# Patient Record
Sex: MALE | Race: BLACK OR AFRICAN AMERICAN | Employment: UNEMPLOYED | URBAN - NONMETROPOLITAN AREA
[De-identification: names, ages, dates, MRNs, and addresses within clinical notes are randomized per-mention and may not be internally consistent; named-entity substitution may affect disease eponyms.]

---

## 2022-01-31 ENCOUNTER — HOSPITAL ENCOUNTER (INPATIENT)
Age: 27
LOS: 15 days | Discharge: HOME | DRG: 750 | End: 2022-02-15
Attending: PSYCHIATRY & NEUROLOGY | Admitting: PSYCHIATRY & NEUROLOGY
Payer: MEDICAID

## 2022-01-31 PROBLEM — F20.9 SCHIZOPHRENIA (HCC): Status: ACTIVE | Noted: 2022-01-31

## 2022-01-31 PROCEDURE — 74011250637 HC RX REV CODE- 250/637: Performed by: PSYCHIATRY & NEUROLOGY

## 2022-01-31 PROCEDURE — 65220000003 HC RM SEMIPRIVATE PSYCH

## 2022-01-31 RX ORDER — HYDROXYZINE 50 MG/1
50 TABLET, FILM COATED ORAL
Status: DISCONTINUED | OUTPATIENT
Start: 2022-01-31 | End: 2022-02-15 | Stop reason: HOSPADM

## 2022-01-31 RX ORDER — BENZTROPINE MESYLATE 1 MG/1
1 TABLET ORAL
Status: DISCONTINUED | OUTPATIENT
Start: 2022-01-31 | End: 2022-02-15 | Stop reason: HOSPADM

## 2022-01-31 RX ORDER — RISPERIDONE 1 MG/1
2 TABLET, FILM COATED ORAL 2 TIMES DAILY
Status: DISCONTINUED | OUTPATIENT
Start: 2022-01-31 | End: 2022-02-15 | Stop reason: HOSPADM

## 2022-01-31 RX ORDER — DIPHENHYDRAMINE HYDROCHLORIDE 50 MG/ML
50 INJECTION, SOLUTION INTRAMUSCULAR; INTRAVENOUS
Status: DISCONTINUED | OUTPATIENT
Start: 2022-01-31 | End: 2022-02-15 | Stop reason: HOSPADM

## 2022-01-31 RX ORDER — ACETAMINOPHEN 325 MG/1
650 TABLET ORAL
Status: DISCONTINUED | OUTPATIENT
Start: 2022-01-31 | End: 2022-02-15 | Stop reason: HOSPADM

## 2022-01-31 RX ORDER — ADHESIVE BANDAGE
30 BANDAGE TOPICAL DAILY PRN
Status: DISCONTINUED | OUTPATIENT
Start: 2022-01-31 | End: 2022-02-15 | Stop reason: HOSPADM

## 2022-01-31 RX ORDER — TRAZODONE HYDROCHLORIDE 50 MG/1
50 TABLET ORAL
Status: DISCONTINUED | OUTPATIENT
Start: 2022-01-31 | End: 2022-02-15 | Stop reason: HOSPADM

## 2022-01-31 RX ORDER — HALOPERIDOL 5 MG/ML
5 INJECTION INTRAMUSCULAR
Status: DISCONTINUED | OUTPATIENT
Start: 2022-01-31 | End: 2022-02-15 | Stop reason: HOSPADM

## 2022-01-31 RX ORDER — LORAZEPAM 2 MG/ML
1 INJECTION INTRAMUSCULAR
Status: DISCONTINUED | OUTPATIENT
Start: 2022-01-31 | End: 2022-02-15 | Stop reason: HOSPADM

## 2022-01-31 RX ORDER — IBUPROFEN 200 MG
1 TABLET ORAL DAILY
Status: DISCONTINUED | OUTPATIENT
Start: 2022-01-31 | End: 2022-02-13

## 2022-01-31 RX ORDER — OLANZAPINE 2.5 MG/1
5 TABLET ORAL
Status: DISCONTINUED | OUTPATIENT
Start: 2022-01-31 | End: 2022-02-15 | Stop reason: HOSPADM

## 2022-01-31 RX ADMIN — RISPERIDONE 2 MG: 1 TABLET ORAL at 20:18

## 2022-01-31 RX ADMIN — RISPERIDONE 2 MG: 1 TABLET ORAL at 15:39

## 2022-01-31 NOTE — BH NOTES
Behavioral Health Treatment Team Note     Patient goal(s) for today: [place]  Treatment team focus/goals: Engage in treatment, monitor medications    Progress note: Pt presents under TDO. Pt brought to unit to orient to the program.  Pt laid down in bed and rested the rest of the afternoon. Will attempt biopsychosocial tomorrow. LOS:  0  Expected LOS: 3    Insurance info/prescription coverage:  [facility]  Date of last family contact:  None  Family requesting physician contact today:  no  Discharge plan:   To be determined  Guns in the home: Unknown  Outpatient provider(s):  Unknown

## 2022-01-31 NOTE — CONSULTS
Hospitalist Consult  Primary Care Provider: None  Consult requested by: Dr Braulio Damon:     Junior Powers is a 27 y.o. male who is being evaluated in the behavioral health unit for paranoid schizophrenia. Patient was brought to the emergency room at an outside facility after her mother called for ECO. Patient with paranoid schizophrenia, has been walking to his girlfriend outside in the cold and also try to jump off a moving vehicle while on the way to the ER. Currently patient is calm and cooperative. No acute complaint at this time. He/She denies any headaches or dizziness. Denies any cough, chest pain, shortness of breath. Denies fever or chills. Denies nausea, vomiting, diarrhea, constipation. Review of Systems:    A comprehensive review of systems was negative except for that written in the History of Present Illness. Past Medical History:   Diagnosis Date    [facility] ([facility]     [facility]       History reviewed. No pertinent surgical history. Prior to Admission medications    Not on File     No Known Allergies   Family History   Problem Relation Age of Onset    No Known Problems Mother     No Known Problems Father     No Known Problems Sister     No Known Problems Brother     No Known Problems Maternal Aunt     No Known Problems Maternal Uncle     No Known Problems Paternal Aunt     No Known Problems Paternal Uncle     No Known Problems Maternal Grandmother     No Known Problems Maternal Grandfather     No Known Problems Paternal Grandmother     No Known Problems Paternal Grandfather     No Known Problems Other     Depression Neg Hx     Suicide Neg Hx     Psychotic Disorder Neg Hx     Substance Abuse Neg Hx     Dementia Neg Hx         SOCIAL HISTORY:  Patient resides at Home. Patient ambulates with no assistance.    Smoking history: 3 cigarettes/day  Alcohol history: None  Drug use: Marijuana    Objective:       Physical Exam:   Visit Vitals  /81 (BP Patient Position: Sitting)   Pulse 63   Temp 97.8 °F (36.6 °C)   Resp 20   Ht 5' 11\" (1.803 m)   SpO2 98%     General:  Alert, cooperative, no distress, appears stated age. Head:  Normocephalic, without obvious abnormality, atraumatic. Eyes:  Conjunctivae/corneas clear. PERRL, EOMs intact. Fundi benign   Ears:  Normal TMs and external ear canals both ears. Nose: Nares normal. Septum midline. Mucosa normal. No drainage or sinus tenderness. Throat: Lips, mucosa, and tongue normal. Teeth and gums normal.   Neck: Supple, symmetrical, trachea midline, no adenopathy, thyroid: no enlargement/tenderness/nodules, no carotid bruit and no JVD. Back:   Symmetric, no curvature. ROM normal. No CVA tenderness. Lungs:   Clear to auscultation bilaterally. Chest wall:  No tenderness or deformity. Heart:  Regular rate and rhythm, S1, S2 normal, no murmur, click, rub or gallop. Abdomen:   Soft, non-tender. Bowel sounds normal. No masses,  No organomegaly. Genitalia:  Deferred   Rectal:  Deferred   Extremities: Extremities normal, atraumatic, no cyanosis or edema. Pulses: 2+ and symmetric all extremities. Skin: Skin color, texture, turgor normal. No rashes or lesions   Lymph nodes: Cervical, supraclavicular, and axillary nodes normal.   Neurologic: CNII-XII intact. Normal strength, sensation and reflexes throughout. Data Review: All diagnostic labs and studies have been reviewed. Assessment:     Active Problems:    Schizophrenia ([facility] (1/31/2022)        Plan:     Inpatient psychiatry management per [facility] team.  May offer nicotine patch if patient desires. Otherwise medically stable.     Signed By: Malcom Vega MD     Date of Service:  1/31/2022

## 2022-01-31 NOTE — BH NOTES
Pt arrived on BHU under a TDO, ambulatory, accompanied by transport person and security. Pt vital signs are stable, no s/s of physical distress noted. Pt denies SI/HI upon admission. Per prescreen Mom states Pt exited the moving vehicle when she brought him to the ER. Denies A/V hallucinations, however s/s present including requiring repetition of instructions. Pt reports he's not sure why he's here, states \"Mom told Police, I didn't do anything. .. I'm still here\". Pt mom is Amor Rosales, whom he lives with and she took out the TDO. Pt has a history of psych admission but is unable to recall where, prescreen identifies [facility] 11/24/21 and [facility] 7/6/21. Pt states he has not been taking any medications for last month because his insurance would not cover it, per prescreen Pt was on Invega oral.  Pt mood is calm, stable. Pt is answering questions appropriately however appears guarded. Pt changed into hospital scrubs, and his clothing (hoodie, t-shirt,stocking hat, sweatpants, socks, slippers, and underwear) and cigarettes w. Lighter were placed in locker. No contraband observed. Pt was instructed on hospital policies and given Pt handbook. Pt requests nicotine patch states he smokes 3 cigarettes/day, denies alcohol use, acknowledges THC. Pt was given tour of unit and escorted to his room 105-1. Pt attempted to call his mom, however she did not answer. q15 minute checks for safety.

## 2022-02-01 LAB
CHOLEST SERPL-MCNC: 102 MG/DL
EST. AVERAGE GLUCOSE BLD GHB EST-MCNC: 91 MG/DL
HBA1C MFR BLD: 4.8 % (ref 4–5.6)
HDLC SERPL-MCNC: 59 MG/DL
HDLC SERPL: 1.7 {RATIO} (ref 0–5)
LDLC SERPL CALC-MCNC: 32 MG/DL (ref 0–100)
LIPID PROFILE,FLP: NORMAL
TRIGL SERPL-MCNC: 55 MG/DL (ref ?–150)
VLDLC SERPL CALC-MCNC: 11 MG/DL

## 2022-02-01 PROCEDURE — 80061 LIPID PANEL: CPT

## 2022-02-01 PROCEDURE — 74011250637 HC RX REV CODE- 250/637: Performed by: PSYCHIATRY & NEUROLOGY

## 2022-02-01 PROCEDURE — 83036 HEMOGLOBIN GLYCOSYLATED A1C: CPT

## 2022-02-01 PROCEDURE — 65220000003 HC RM SEMIPRIVATE PSYCH

## 2022-02-01 RX ADMIN — RISPERIDONE 2 MG: 1 TABLET ORAL at 20:27

## 2022-02-01 RX ADMIN — RISPERIDONE 2 MG: 1 TABLET ORAL at 08:09

## 2022-02-01 NOTE — BH NOTES
B: Pt. Alert and oriented x 4. Pt. States depression is a 3. Pt. States anxiety is 5. Pt. Denies hallucinations. Denies SI/HI. Cooperative with assessment. Pt. States he is just tired and wants to rest, Pt. States, \" I am in no distress, just sleepy\". I:   Administer medications as ordered and needed, Encourage pt to attend and participate in groups, encourage pt to be up for all meals and snacks, consuming all each time, encourage pt to interact with peers in a positive manner. Q 15 minute safety checks continue. R:   Compliant with medications. does attend groups, does participate. Pt. si getting up for meals, consumes all of meals, snacks. Pt. does interact with peers. no safety concerns at this time. P:   Pt. Will develop and continue to utilize positive coping skills.

## 2022-02-01 NOTE — GROUP NOTE
MELVINA  GROUP DOCUMENTATION INDIVIDUAL                                                                          Group Therapy Note    Date: 2/1/2022    Group Start Time: 1430  Group End Time: 6844  Group Topic: Education Group - Inpatient    [facility]    Group Therapy Note    LCSW facilitated psychoeducational group on emotions. Group members participated in discussion of emotions, their most uncomfortable emotions, and related questions. LCSW facilitated experiential learning exercise on mindful acceptance of emotions. Attendance: Attended    Patient's Goal:  Attendance    Interventions/techniques: Informed, Validated and Supported    Follows Directions:  Followed directions    Interactions: Interacted appropriately    Mental Status: Calm and Congruent    Behavior/appearance: Attentive and Cooperative    Goals Achieved: Able to engage in interactions and Able to listen to others        MARTI Benjamin, LCSW unsure

## 2022-02-01 NOTE — BH NOTES
PSYCHOSOCIAL ASSESSMENT  :Patient identifying info:   Nile Haynes is a 27 y.o., male admitted 1/31/2022  8:52 AM     Presenting problem and precipitating factors: Pt is under TDO. Pt denies knowing why he is here. Pt reports, \"I didn't do anything. Mom told the police probably to control me. \" Pt reports, \"Mom wanted to get me out of the house. \"  Pt denies knowledge of any situation, arguments, or precipitating factors leading to hospitalization. Pt reports he was not taking medications outside of the hospital because his insurance would not pay for it. Pt reports a hx of hospitalizations including one at the [facility] last year. Pt states he has been diagnosed with \"Paranoid Schizophrenia. \" Pt reports THC use, but states that \"I do not have money right now. \" Pt reports before he was using marijuana daily, but could not state the amount. Pt reports a hx of alcohol abuse up to 2 bottles of various kinds of liquor a day. Pt reports he has not had a drink in several months. Mental status assessment: Pt is AO x 4, denies SI/HI, denies AVH, cooperative with assessment. Pt presents guarded with a flat affect. Pt appeared fatigued. Reports his mood is [place]. \"  Thinking is logical upon interview. Speech is goal-oriented, soft tone, and slow. Insight is poor to fair. Strengths/Recreation/Coping Skills: Denies recreation or coping skills. Collateral information: Boni arana B [phone]: Prescreen notes pt's mother petitioned for [employer] stating, \"Casimiro is paranoid schizophrenic and he hasn't been taking his meds. Rita Bloodgood Rita Bloodgood \"  She also reports that pt has been walking out in the cold to his ex-girlfriend's house where he is not wanted - police were called to get him to leave. Pt jumped out of the car when turning into the hospital. Danne Son notes pt began hearing voices in 2021 telling him to harm others. Pt describes hearing one male voice upon interview, but not discernable words.  Delusional thoughts about being a prophet and belief in the illuminati. Paranoia that others or something is watching him. Pt ran out of medication due to inability to pay $1300 or it. Current psychiatric /substance abuse providers and contact info: Kena Porras    Previous psychiatric/substance abuse providers and response to treatment: Clara    Family history of mental illness or substance abuse: Denies    Substance abuse history:  Hx of alcohol abuse up to 2 bottles of liquor a day - various types. THC use. Social History     Tobacco Use    Smoking status: Current Every Day Smoker     Packs/day: 0.25     Years: 10.00     Pack years: 2.50    Smokeless tobacco: Never Used   Substance Use Topics    Alcohol use: Not Currently       History of biomedical complications associated with substance abuse: None noted    Patient's current acceptance of treatment or motivation for change: Pt desires discharge. Family constellation: Pt was raised by his mother. Pt does not have a relationship with his father. Pt has 4 siblings. Is significant other involved? None    Describe support system: Mother    Describe living arrangements and home environment: Pt reports he lives at home at this time with his mother. 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      Have you been licensed as a health care professional (current or ): None    Leisure and recreation preferences: None    Describe coping skills:None    Chelo Ríos, MSW, LCSW  2022    PSA PART II ADDITIONAL INFORMATION        Access To Fire Arms: No    Substance Use: YES    Last Use: January 2022    Type of Substance: THC use    Frequency of Use: Occasional     Request to See : NO    If yes, notified : NO    Guardian:NO    Guardian Contact:None    Release of Information Signed: YES    Release of Information Signed For: Cele Elliott: [phone]

## 2022-02-01 NOTE — PROGRESS NOTES
Problem: Psychosis  Goal: *STG: Remains safe in hospital  1/31/2022 2347 by Jenni Ni RN  Outcome: Progressing Towards Goal  1/31/2022 2347 by Jenni Ni RN  Outcome: Progressing Towards Goal  Goal: *STG/LTG: Complies with medication therapy  Outcome: Progressing Towards Goal

## 2022-02-01 NOTE — BH NOTES
B: Pt alert and oriented x3, Pt cooperative and pleasant during assessment. Pt completed his OQ Analyst  Questionnaire. Pt denies anxiety, depression, SI/HI and AVH. Pt given Risperdal 2mg po and Nicotine Patch retrieved. Poor eye contact and appears a little bizarre. Pt continues on Q15min safety checks.      I: Encourage pt to attend and participate in all groups and wrap up; Administer medication as ordered and needed. Encourage pt be up for all meals and snacks. Encourage pt to interact with staff/peers in a positive manner; Pt continues on [id number] safety checks.     R: Pt received snack and participated in wrap up.  Did express on wrap up sheet \" dealing with being in this crazy house\" Pt did interact with his roommate and staff. Pt medication administer at this time. Pt continues on Q15min safety checks.     P: Pt will develop and utilize positive coping skills. Pt Will continue on Q15min safety checks     0600: Pt slept throughout the night with no distress noted

## 2022-02-02 PROCEDURE — 74011250637 HC RX REV CODE- 250/637: Performed by: PSYCHIATRY & NEUROLOGY

## 2022-02-02 PROCEDURE — 65220000003 HC RM SEMIPRIVATE PSYCH

## 2022-02-02 RX ADMIN — RISPERIDONE 2 MG: 1 TABLET ORAL at 21:19

## 2022-02-02 RX ADMIN — RISPERIDONE 2 MG: 1 TABLET ORAL at 08:16

## 2022-02-02 NOTE — H&P
Initial psychiatric evaluation    Chief complaint  Exiting moving vehicle ,paranoid ideations, responding to internal stimuli, depressed mood    History of present illness  82-year-old male admitted to psychiatric unit on a temporary detaining order  He was admitted due to safety concerns, and acute psychotic outlook with paranoid ideations and hearing voices. He exited the morning week of angry he was brought to the emergency room. He was confused and paranoid and required repetition of instructions. He said that his mom was concerned about him and mother told police about his difficulties and recent issues. He has history of psychiatric admissions but is unable to give good history and he has been in [facility] before and November and [facility] in [place] in July. He has not been taking his medications for the last month which I believe was John Shivani. He was willing to take other medications similar to that and we ordered risperidone 2 mg to take as a day for him. He has been having difficulties with paranoid ideations not able to think clearly confused preoccupied and hearing voices likely responding to internal stimuli.   He is willing to continue his hospitalization and pledges for safety on the unit admits to depressive symptoms as well depressed mood anhedonia low energy and low motivation along with psychotic symptoms    Past psychiatric history  Multiple previous psychiatric hospitalizations due to psychotic symptoms in larger area hospitals such as [address] [place] [address] and [facility] [place] [facility].  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  Recently unemployed    Trauma history  Denied    Legal history  He was drunk in [address] status exam  Alert oriented fair eye contact  Speech is goal-directed soft tone  Mood is depressed affect is appropriate and guarded at times  Positive paranoid ideations  Positive responding to internal stimuli  Auditory hallucinations  Insight and judgment poor to fair    Diagnosis  Schizophrenia    Recommendations  Patient is appropriate for psychiatric hospitalization which is medically necessary  I will start him on risperidone 2 mg 2 times a day  He will also benefit from psychosocial interventions  Follow-up with me in the team again tomorrow

## 2022-02-02 NOTE — BH NOTES
Notified Dr. Javid Correa regarding  and regular, BP 93/57. Per Dr. Javid Correa, okay to encourage frequent hydration.

## 2022-02-02 NOTE — BH NOTES
B: Pt alert and oriented x3, Pt cooperative and pleasant during assessment. Pt denies anxiety, depression, SI/HI and AVH. Report that pt stayed in bed most of the day. Did write on wrap up, that he handled his anger and the voices well today but when I asked about voices pt denied. Poor eye contact still remains. Pt continues on Q15min safety checks.      I: Encourage pt to attend and participate in all groups and wrap up; Administer medication as ordered and needed. Encourage pt be up for all meals and snacks. Encourage pt to interact with staff/peers in a positive manner; Pt continues on [id number] safety checks.     R: Pt received snack and participated in wrap up. Did write on wrap up, that he handled his anger and the voices well today but when I asked about voices pt denied. Pt did interact with his roommate and staff. Pt medication administer at this time. Pt continues on Q15min safety checks.     P: Pt will develop and utilize positive coping skills. Pt Will continue on Q15min safety checks. 0545: Heart rate 118.  Once again setup really quick.     0600: Pt slept throughout the night with no distress noted

## 2022-02-02 NOTE — PROGRESS NOTES
Discussed case interviewed patient  Taking and tolerating medications well  He is now on risperidone 2 mg 2 times a day  Getting along okay with his roommate  Interacts with peers and staff  Attend some groups  Remains paranoid and guarded  Sleep and appetite is fair  No overt aggression    Mental status exam  Alert oriented good eye contact  Speech is goal-directed soft tone  Mood is depressed  Affect is restricted  Thought process linear and illogical  Insight and judgment fair  Positive paranoid ideations    Recommendations  Continue inpatient treatments which is medically necessary  Safety issues discussed  Follow-up with me in team again tomorrow

## 2022-02-02 NOTE — PROGRESS NOTES
Spiritual Care Assessment/Progress Note  [facility]      NAME: Ale Pedraza      MRN: [id number]  AGE: 27 y.o.  SEX: male  Voodoo Affiliation: No preference   Language: English     2/2/2022     Total Time (in minutes): 10     Spiritual Assessment begun in [facility] through conversation with:         [x]Patient        [] Family    [] Friend(s)        Reason for Consult: Initial/Spiritual assessment, patient floor     Spiritual beliefs: (Please include comment if needed)     [] Identifies with a darby tradition:         [] Supported by a darby community:            [x] Claims no spiritual orientation:           [] Seeking spiritual identity:                [] Adheres to an individual form of spirituality:           [] Not able to assess:                           Identified resources for coping:      [] Prayer                               [] Music                  [] Guided Imagery     [] Family/friends                 [] Pet visits     [] Devotional reading                         [] Unknown     [] Other:                                              Interventions offered during this visit: (See comments for more details)    Patient Interventions: Affirmation of emotions/emotional suffering,Voodoo beliefs/image of God discussed           Plan of Care:     [x] Support spiritual and/or cultural needs    [] Support AMD and/or advance care planning process      [] Support grieving process   [] Coordinate Rites and/or Rituals    [] Coordination with community clergy   [] No spiritual needs identified at this time   [] Detailed Plan of Care below (See Comments)  [] Make referral to Music Therapy  [] Make referral to Pet Therapy     [] Make referral to Addiction services  [] Make referral to [facility]  [] Make referral to Spiritual Care Partner  [] No future visits requested        [x] Contact Spiritual Care for further referrals     Comments: Visited with patient while in the [facility] Unit. Patient shared that he does not identify with any particular Rastafarian.  provided emotional and spiritual support. Advised of  availability. Advised nurse to contact [facility] for any further referrals. Rev.  Andria Males, [address], [address]

## 2022-02-02 NOTE — PROGRESS NOTES
Problem: Psychosis  Goal: *STG: Decreased hallucinations  Outcome: Not Progressing Towards Goal  Goal: *STG: Remains safe in hospital  Outcome: Progressing Towards Goal  Goal: *STG/LTG: Complies with medication therapy  Outcome: Progressing Towards Goal

## 2022-02-02 NOTE — BH NOTES
B: Pt. Alert and oriented x 3. Pt. States depression is a 6. Pt. States anxiety is 4. Pt. Endorses auditory hallucinations. Denies SI/HI. No other complaints with assessment. I:   Administer medications as ordered and needed, Encourage pt to attend and participate in groups, encourage pt to be up for all meals and snacks, consuming all each time, encourage pt to interact with peers in a positive manner. Q 15 minute safety checks continue. R:   Compliant with medications. does not attend groups, does not participate. Pt. is getting up for meals, consumes 75% of meals, snacks. Pt. does interact with peers. no safety concerns at this time. P:   Pt. Will develop and continue to utilize positive coping skills. Patient will attend and participate in group sessions.

## 2022-02-03 PROCEDURE — 74011250637 HC RX REV CODE- 250/637: Performed by: PSYCHIATRY & NEUROLOGY

## 2022-02-03 PROCEDURE — 65220000003 HC RM SEMIPRIVATE PSYCH

## 2022-02-03 RX ADMIN — RISPERIDONE 2 MG: 1 TABLET ORAL at 08:22

## 2022-02-03 RX ADMIN — RISPERIDONE 2 MG: 1 TABLET ORAL at 21:16

## 2022-02-03 NOTE — BH NOTES
B: Pt has been resting in bed this evening, up for evening group and snack. Denies suicidal and homicidal thoughts, says he is not feeling depressed or anxious at this time, just tired. Compliant with meds, safe on unit. I: Maintain a safe environment for pt, safety cks q 15 mins and prn. Give meds as ordered, and encourage groups. R: Pt is cooperative with assessment, denies suicidal or homicidal thoughts. Some thoughts blocking noted and pt noted to be shaking his foot rapidly while talking to nurse. States he does not feel depressed or anxious at this time. Encouraged pt to ask staff if he needs anything or has any questions. Safe on unit. P: Continue to monitor, give meds as ordered, encourage groups.

## 2022-02-03 NOTE — BH NOTES
Behavioral Health Treatment Team Note     Patient goal(s) for today: Learn alternatives to aggression  Treatment team focus/goals: Engage in treatment, monitor medications    Progress note: Involuntary commitment up to 30 days. Pt is AO x 4, denies SI/HI, denies AVH, cooperative with assessment. Pt presents guarded with a flat affect. Pt appears fatigued. Reports his mood is \"good. \"  Thinking is logical upon interview. Speech is goal-oriented, soft tone, and slow. Insight is poor to fair. Pt is coming to groups and participating when called upon. Pt remains mostly quiet. Pt desires to keep working on ways of being less aggressive. Pt is working to identify negative thinking patterns and is working on distancing and challenging negative patterns. Will continue to engage in treatment and monitor medications.     LOS:  3  Expected LOS: 30    Insurance info/prescription coverage:  [address]  Date of last family contact: None  Family requesting physician contact today:  no  Discharge plan:  Discharge home with follow-up in the community  Guns in the home:  no   Outpatient provider(s):  [email or web address]

## 2022-02-03 NOTE — BH NOTES
Pt slept well tonight with no complaints voiced and no problems noted on rounds. Safe on unit will continue to monitor.

## 2022-02-03 NOTE — BH NOTES
B: Pt. Alert and oriented x 3. Pt. States depression is a 4. Pt. States anxiety is 2. Pt. denies hallucinations. Denies SI/HI. No complaints with assessment. I:   Administer medications as ordered and needed, Encourage pt to attend and participate in groups, encourage pt to be up for all meals and snacks, consuming all each time, encourage pt to interact with peers in a positive manner. Q 15 minute safety checks continue. R:   Compliant with medications. does attend groups, does participate. Pt. is getting up for meals, consumes 100% of meals, snacks. Pt. does interact with peers. no safety concerns at this time. P:   Pt. Will develop and continue to utilize positive coping skills. Will attend and participate in group sessions, 1:1 sessions, and treatment team meetings.

## 2022-02-03 NOTE — GROUP NOTE
MELVINA  GROUP DOCUMENTATION INDIVIDUAL                                                                          Group Therapy Note    Date: 2/3/2022    Group Start Time: 9429  Group End Time: 1100  Group Topic: Education Group - Inpatient    [facility] GROUP DOCUMENTATION GROUP    Group Therapy Note    Group members participated in morning group. Group members read through Emi Schaefer in [address] [facility],[employer]. \"  LCSW facilitated discussion on stages on change, where pts found themselves in the change process, and barriers to change. Motivation was also discussed. Attendance: Attended    Patient's Goal:  Attendance    Interventions/techniques: Informed, Validated and Supported    Follows Directions:  Followed directions    Interactions: Interacted appropriately    Mental Status: Calm and Congruent    Behavior/appearance: Attentive    Goals Achieved: Able to engage in interactions and Able to listen to others          MARTI Hudson, LCSW

## 2022-02-03 NOTE — PROGRESS NOTES
Pt is progressing toward his goals, denies suicidal and homicidal thoughts. States he is not feeling depressed or anxious at this time. Compliant with meds, encouraged to notify staff for any needs. Safe on unit.

## 2022-02-03 NOTE — GROUP NOTE
MELVINA  GROUP DOCUMENTATION INDIVIDUAL                                                                          Group Therapy Note    Date: 2/3/2022    Group Start Time: 1430  Group End Time: 0091  Group Topic: Education Group - Inpatient    [facility] GROUP DOCUMENTATION GROUP    Group Therapy Note      Group members participated in psychoeducational CBT oriented group. LCSW provided psychoeducational interventions on core beliefs, distorted thoughts, and unhooking from unhelpful thoughts. Group members identified some of their negative cognitions and LCSW assisted in helping notice and unhook from the patterns. Attendance: Attended    Patient's Goal:  Attendance    Interventions/techniques: Informed and Validated    Follows Directions:  Followed directions    Interactions: Interacted appropriately    Mental Status: Calm and Congruent    Behavior/appearance: Attentive and Cooperative    Goals Achieved: Able to engage in interactions and Able to listen to others        MARTI Carrasquillo, LCSW

## 2022-02-04 PROCEDURE — 65220000003 HC RM SEMIPRIVATE PSYCH

## 2022-02-04 PROCEDURE — 74011250637 HC RX REV CODE- 250/637: Performed by: PSYCHIATRY & NEUROLOGY

## 2022-02-04 RX ADMIN — RISPERIDONE 2 MG: 1 TABLET ORAL at 08:23

## 2022-02-04 RX ADMIN — RISPERIDONE 2 MG: 1 TABLET ORAL at 20:55

## 2022-02-04 NOTE — PROGRESS NOTES
Problem: Psychosis  Goal: *STG: Decreased hallucinations  Outcome: Progressing Towards Goal  Goal: *STG: Decreased delusional thinking  Outcome: Progressing Towards Goal  Goal: *STG: Remains safe in hospital  Outcome: Progressing Towards Goal  Goal: *STG: Seeks staff when feelings of self harm or harm towards others arise  Outcome: Progressing Towards Goal  Goal: *STG: Participates in individual and group therapy  Outcome: Progressing Towards Goal  Goal: *STG/LTG: Complies with medication therapy  Outcome: Progressing Towards Goal  Goal: Interventions  Outcome: Progressing Towards Goal

## 2022-02-04 NOTE — BH NOTES
Pt slept overnight, remained sleeping as of this time. , no violent no self harming behavior noticed or reported

## 2022-02-04 NOTE — PROGRESS NOTES
Psychiatric Progress Note    Patient: Jamshid Weir MRN: [id number]  SSN: [id number]    YOB: 1995  Age: 27 y.o. Sex: male      Admit Date: 1/31/2022       Subjective:     Jamshid Weir  reports feeling less anxious, depressed. Unable to articulate specifics as to why he is feeling better. Denies SI/HI/AH/VH. No aggression or violence. Appropriately interactive and aware. Tolerating medications well. Eating well and sleeping well. Case reviewed with nursing staff and no significant issues or events reported in the last 24 hours. Staff has observed patient interacting on the unit and attending group.     Objective:     Vitals:    02/03/22 0602 02/03/22 1100 02/03/22 1604 02/04/22 0552   BP: 127/63  114/75 103/62   Pulse: (!) 115  83 92   Resp: 20  18 16   Temp: 97.5 °F (36.4 °C)  97.6 °F (36.4 °C) 97.7 °F (36.5 °C)   SpO2: 100%  98% 98%   Weight:  72.6 kg (160 lb)     Height:            Mental Status Exam:   Sensorium  Oriented to time, place, and situation   Relations Connects with interviewer and engages appropriately   Eye Contact Appropriate   Appearance:  Appropriate for venue and season   Speech:  Normal rate, tone, volume and pattern   Thought Process: Linear, logical and goal-directed   Thought Content Free of delusions, hallucinations and does not appear to be responding to internal stimuli   Suicidal ideations None active and contracts for safety   Mood:   Depressed   Affect:  Flat, constricted and mood congruent   Memory    Intact   Concentration:  Intact   Insight:   Fair   Judgment:  Fair     No signs of tardive dyskinesia or extrapyramidal symptoms    MEDICATIONS:  Current Facility-Administered Medications   Medication Dose Route Frequency    OLANZapine (ZyPREXA) tablet 5 mg  5 mg Oral Q6H PRN    haloperidol lactate (HALDOL) injection 5 mg  5 mg IntraMUSCular Q6H PRN    benztropine (COGENTIN) tablet 1 mg  1 mg Oral BID PRN    diphenhydrAMINE (BENADRYL) injection 50 mg  50 mg IntraMUSCular BID PRN    hydrOXYzine HCL (ATARAX) tablet 50 mg  50 mg Oral TID PRN    LORazepam (ATIVAN) injection 1 mg  1 mg IntraMUSCular Q4H PRN    traZODone (DESYREL) tablet 50 mg  50 mg Oral QHS PRN    acetaminophen (TYLENOL) tablet 650 mg  650 mg Oral Q4H PRN    magnesium hydroxide (MILK OF MAGNESIA) 400 mg/5 mL oral suspension 30 mL  30 mL Oral DAILY PRN    nicotine (NICODERM CQ) 14 mg/24 hr patch 1 Patch  1 Patch TransDERmal DAILY    risperiDONE (RisperDAL) tablet 2 mg  2 mg Oral BID        DISCUSSION:  Discussed risk and benefits of medication, provided an opportunity to answer any questions, reviewed discharge goals. Lab/Data Review:  No results found for this or any previous visit (from the past 24 hour(s)). Assessment:     Active Problems:    Schizophrenia ([facility] [address] (1/31/2022)        Plan:     Continue current plan of care excepted as noted. We will follow closely and consider the possibility of bupropion or a little activation if his affect remains blunted. Continue to participate in the milieu of activities and work towards discharge goals. Contracts for safety and has no immediate requests    Disposition planning with social work with the goal of a transition to an outpatient level of care. Patient would benefit from ongoing care as they have not yet reached their discharge goals are psychotropic medication optimization.     Tentative discharge TBD     Signed By: Sana Randall, PhD, PA-C, PsyCAQ    February 4, 2022

## 2022-02-04 NOTE — BH NOTES
Behavioral Health Treatment Team Note      Patient goal(s) for today: Learn alternatives to aggression  Treatment team focus/goals: Engage in treatment, monitor medications     Progress note: Involuntary commitment up to 30 days. Pt is AO x 4, denies SI/HI, denies AVH, cooperative with assessment.  Pt presents guarded with a flat affect.  Pt appears fatigued.  Reports his mood is \"good. \" Marzetta Sebastián is logical upon interview. Bebe  is goal-oriented, soft tone, and slow. Insight is fair. Pt will come to groups and participate when called upon. No aggression.    Will continue to engage in treatment and monitor medications.      LOS:  4                       Expected LOS: 30     Insurance info/prescription coverage:  [employer]  Date of last family contact: None  Family requesting physician contact today:  no  Discharge plan:  Discharge home with follow-up in the community  Guns in the home:  no   Outpatient provider(s):  [facility]

## 2022-02-04 NOTE — BH NOTES
B:  Pt received in his bed sleeping, woke up when called,Pt. Alert and oriented x 3. Pt. States depression is a 2  Pt. States anxiety is 0. Pt. denies hallucinations. but look occupied. Denies SI/HI. No complaints with assessment. I:   Administer medications as ordered and needed, Encourage pt to attend and participate in groups, encourage pt to be up for all meals and snacks, consuming all each time, encourage pt to interact with peers in a positive manner. Q 15 minute safety checks continue. R:   Compliant with medications. does attend groups, does participate. Pt. Ate HS snack. Pt. Does not  interact with peers. no safety concerns at this time. P:   juliocesar continue current treatment plan and safety checks Q 15 minutes. [place] Donaldson

## 2022-02-05 PROCEDURE — 74011250637 HC RX REV CODE- 250/637: Performed by: PSYCHIATRY & NEUROLOGY

## 2022-02-05 PROCEDURE — 65220000003 HC RM SEMIPRIVATE PSYCH

## 2022-02-05 RX ADMIN — RISPERIDONE 2 MG: 1 TABLET ORAL at 08:20

## 2022-02-05 RX ADMIN — RISPERIDONE 2 MG: 1 TABLET ORAL at 21:24

## 2022-02-05 NOTE — BH NOTES
B: Received lying in bed awake. Complete nursing assesmnet done. Lungs clear. Pt. Alert and oriented x 3. Pt. States depression is a 0, but appears to be in Depressed mode, evidenced by, poor eye contact, quick short answers, staying in bed , no interacting with others and refusing breakfast.  Pt. States anxiety is 0. Pt. denies hallucinations. Denies SI/HI. Cooperative with assessment. I:   Administer medications as ordered and needed, Encourage pt to attend and participate in groups, encourage pt to be up for all meals and snacks, consuming all each time, encourage pt to interact with peers in a positive manner. Q 15 minute safety checks continue. R:   Compliant with medications. does not attend groups, does not participate. Pt. is getting up for meals, consumes 25% of meals, snacks. Pt. does not interact with peers. no safety concerns at this time. P:   Pt. Will develop and continue to utilize positive coping skills. Will participate and interact in group. Plan of care continues as ordered.

## 2022-02-05 NOTE — PROGRESS NOTES
Problem: Psychosis  Goal: *STG: Remains safe in hospital  Outcome: Progressing Towards Goal  Goal: *STG: Seeks staff when feelings of self harm or harm towards others arise  Outcome: Progressing Towards Goal  Goal: *STG: Participates in individual and group therapy  Outcome: Progressing Towards Goal  Goal: *STG/LTG: Complies with medication therapy  Outcome: Progressing Towards Goal  Goal: Interventions  Outcome: Progressing Towards Goal

## 2022-02-05 NOTE — BH NOTES
Pt slept overnight  With some waking intervals to use bathroom, , remained sleeping as of this time. , no violent no self harming behavior noticed or reported

## 2022-02-05 NOTE — BH NOTES
B:  Pt received in his room ,Pt. Alert and oriented x 3.  Pt. States depression is a 3 Pt. States anxiety is 0.  Pt. denies hallucinations. but look preoccupied.  Denies SI/HI. Edilberto Staples complaints with assessment. He attended wrap up group and ate snack very well as he reported. ,pt accepted HS medication and noticed to have bad armpit smell, was offered Deoderant nd he accepted and used it, encouraged to take shower in am.      I:   Administer medications as ordered and needed, Encourage pt to attend and participate in groups, encourage pt to be up for all meals and snacks, consuming all each time, encourage pt to interact with peers in a positive manner. Q 15 minute safety checks continue.     R:   Compliant with medications.  does attend groups, does participate.  Pt. Ate HS snack. Pt. Does not  interact with peers. no safety concerns at this time.     P:   juliocesar continue current treatment plan and safety checks Q 15 minutes. Calin Rosado

## 2022-02-06 PROCEDURE — 74011250637 HC RX REV CODE- 250/637: Performed by: PSYCHIATRY & NEUROLOGY

## 2022-02-06 PROCEDURE — 65220000003 HC RM SEMIPRIVATE PSYCH

## 2022-02-06 RX ADMIN — RISPERIDONE 2 MG: 1 TABLET ORAL at 20:58

## 2022-02-06 RX ADMIN — RISPERIDONE 2 MG: 1 TABLET ORAL at 08:24

## 2022-02-06 NOTE — PROGRESS NOTES
Pt is progressing toward his goals. Denies suicidal and homicidal thoughts, depression or anxiety. Continues to have a dull affect and calm depressed mood. Compliant with meds, eating and sleeping well. Encouraged to attend and participate in groups, safe on unit.

## 2022-02-06 NOTE — BH NOTES
Pt has rested in bed most of the day. Up in group room for meals and snacks. Pleasant and cooperative with staff, states he is doing okay. Denies suicidal and homicidal thoughts. Safe on unit will continue to monitor.

## 2022-02-06 NOTE — BH NOTES
B: Pt alert and oriented x4, Pt cooperative and pleasant during assessment. Pt states depression 6/10, but denies anxiety, SI/HI and AVH. Spoke about his depression. Pt expressed how he isolates at home and here, smoke marijuana and play his game system only. No girlfriend and really doesn't have a social life. Denies voices. Poor eye contact still remains but was more open. Pt states he does feel down ad wants something to help with the feeling. RAHUL Magallon into visit [facility]. Made doctor aware. Pt continues on Q15min safety checks.      I: Encourage pt to attend and participate in all groups and wrap up; Administer medication as ordered and needed. Encourage pt be up for all meals and snacks. Encourage pt to interact with staff/peers in a positive manner; Pt continues on C30dtux safety checks.     R: Pt received snack and participated in wrap up. Did write on wrap up, how he handled his aggression and handling the voices well today but when I asked about voices pt denied. Pt states he doesn't interact with his roommate.  Pt medication administer at this time. Pt continues on Q15min safety checks.     P: Pt will develop and utilize positive coping skills. Pt Will continue on Q15min safety checks.      0600: Pt slept throughout the night with no distress noted

## 2022-02-06 NOTE — PROGRESS NOTES
Problem: Psychosis  Goal: *STG: Remains safe in hospital  Outcome: Progressing Towards Goal  Goal: *STG: Participates in individual and group therapy  Outcome: Progressing Towards Goal  Goal: *STG/LTG: Complies with medication therapy  Outcome: Progressing Towards Goal

## 2022-02-06 NOTE — BH NOTES
B: Pt is awake and alert, states he is doing well. Denies suicidal and homicidal thoughts, depression and anxiety. Denies hallucinations and paranoia. Continues to have dull affect and depressed mood, but is call and pleasant with staff. Cooperative with assessment and says he is still sleepy. Compliant with meds, attending groups. Safe on unit. I: Maintain a safe environment for pt, safety cks q 15 mins and prn. Give meds as ordered, and encourage groups. R: Pt is cooperative with assessment, denies suicidal and homicidal thoughts. Compliany with meds, safe on unit. P: Continue to monitor, give meds as ordered, encourage groups.

## 2022-02-07 PROCEDURE — 74011250637 HC RX REV CODE- 250/637: Performed by: PSYCHIATRY & NEUROLOGY

## 2022-02-07 PROCEDURE — 65220000003 HC RM SEMIPRIVATE PSYCH

## 2022-02-07 RX ORDER — OLANZAPINE 10 MG/1
10 TABLET, ORALLY DISINTEGRATING ORAL
Status: DISCONTINUED | OUTPATIENT
Start: 2022-02-07 | End: 2022-02-15 | Stop reason: HOSPADM

## 2022-02-07 RX ADMIN — RISPERIDONE 2 MG: 1 TABLET ORAL at 21:50

## 2022-02-07 RX ADMIN — OLANZAPINE 10 MG: 10 TABLET, ORALLY DISINTEGRATING ORAL at 22:05

## 2022-02-07 RX ADMIN — RISPERIDONE 2 MG: 1 TABLET ORAL at 08:26

## 2022-02-07 NOTE — PROGRESS NOTES
Psychiatric Progress Note    Patient: Aaliyah Brown MRN: [id number]  SSN: [id number]    YOB: 1995  Age: 27 y.o. Sex: male      Admit Date: 1/31/2022       Subjective:     Aaliyah Brown patient reports to be feeling much better today. It is obvious that his affect is brighter and he appears to be more engaged during the interview. He was noted to be interacting with others more on the unit and taking more of his meals. He could be on an improving trend. Denies SI/HI/VH. No aggression or violence. Appropriately interactive and aware. Background auditory hallucinations noncommand in nature    Tolerating medications well. Patient has not been participating in meals as much and continues to isolate. Case reviewed with nursing staff and no significant issues or events reported in the last 24 hours. Staff has observed patient attending group.     Objective:     Vital signs reviewed and are stable       Mental Status Exam:   Sensorium  Oriented to time, place, and situation   Relations Connects with interviewer and engages appropriately   Eye Contact Appropriate   Appearance:  Appropriate for venue and season   Speech:  Normal rate, tone, volume and pattern   Thought Process: Linear, logical and goal-directed   Thought Content Free of delusions, only background hallucinations and does not appear to be responding to internal stimuli   Suicidal ideations None active and contracts for safety   Mood:   Less depressed    Affect:   Less flat, constricted and mood congruent   Memory    Intact   Concentration:  Intact   Insight:   Fair   Judgment:  Fair     No signs of tardive dyskinesia or extrapyramidal symptoms    MEDICATIONS:  Current Facility-Administered Medications   Medication Dose Route Frequency    OLANZapine (ZyPREXA) tablet 5 mg  5 mg Oral Q6H PRN    haloperidol lactate (HALDOL) injection 5 mg  5 mg IntraMUSCular Q6H PRN    benztropine (COGENTIN) tablet 1 mg  1 mg Oral BID PRN    diphenhydrAMINE (BENADRYL) injection 50 mg  50 mg IntraMUSCular BID PRN    hydrOXYzine HCL (ATARAX) tablet 50 mg  50 mg Oral TID PRN    LORazepam (ATIVAN) injection 1 mg  1 mg IntraMUSCular Q4H PRN    traZODone (DESYREL) tablet 50 mg  50 mg Oral QHS PRN    acetaminophen (TYLENOL) tablet 650 mg  650 mg Oral Q4H PRN    magnesium hydroxide (MILK OF MAGNESIA) 400 mg/5 mL oral suspension 30 mL  30 mL Oral DAILY PRN    nicotine (NICODERM CQ) 14 mg/24 hr patch 1 Patch  1 Patch TransDERmal DAILY    risperiDONE (RisperDAL) tablet 2 mg  2 mg Oral BID        DISCUSSION:  Discussed risk and benefits of medication, provided an opportunity to answer any questions, reviewed discharge goals. Lab/Data Review:  No results found for this or any previous visit (from the past 24 hour(s)). Assessment:     Active Problems:    Schizophrenia ([facility] [address] (1/31/2022)        Plan:     Continue current plan of care excepted as noted. We will follow closely and consider the possibility of bupropion or a little activation if his affect remains blunted. Continue to participate in the milieu of activities and work towards discharge goals. Contracts for safety and has no immediate requests    Disposition planning with social work with the goal of a transition to an outpatient level of care. Patient would benefit from ongoing care as they have not yet reached their discharge goals are psychotropic medication optimization.     Tentative discharge TBD     Signed By: Camila Ramesh, PhD, PA-C, PsyCAQ    February 7, 2022

## 2022-02-07 NOTE — BH NOTES
B: Pt is alert and oriented x4. Pt is cooperative with assessments. Pt reports feeling \"good\". Pt states they are here because hearing voices. Pt identifies they're doing same since their admission because still hearing voices. Pt is able to identify personal coping alternatives: video games \"2K\"-a basketball videogame. No s/s of distress noted. No complaints of pain. I: Assess Pt mood. Document presence of depressive/anxiety symptoms. Assess for Audio/visual hallucinations. Establish trust.  Educate Pt on medications and disease processes. Monitor ADLs, food/fluid consumption and sleep. Encouarge group participation and socialization. Educate Pt on medications, coping alternatives, disease processes, and community resources. Safety checks. R: Pt mood is stable, calm. Pt rates depression 0/10, identifies triggers as none. Pt rates anxiety at 0/10, identifies triggers as none. Pt denies SI. Pt denies HI. Pt reports auditory hallucinations, s/s are observed by staff. Pt states the voices have not improved since admission. States he tries \"to block them off,  Think about something else\". Pt is not attending groups and is not interacting socially with staff/peers. Pt is eating all meals, and is maintaining their personal hygiene. Pt reports sleeping \"okay\". Pt is compliant wiith medications. P: Educate Pt on treatment options.

## 2022-02-07 NOTE — BH NOTES
Behavioral Health Treatment Team Note      Patient goal(s) for today: Learn alternatives to aggression  Treatment team focus/goals: Engage in treatment, monitor medications     Progress note: Involuntary commitment up to 30 days. Pt is AO x 4, denies SI/HI, with auditory hallucinations, cooperative with assessment.  Pt presents guarded with a flat affect.  Pt appears fatigued.  Reports his mood is \"good. \" Geraldo Rocks is logical upon interview. Farhana Day is goal-oriented, soft tone, and slow. Insight is fair. Pt reports he tries to block the voices out or think of something else. Treatment team will communicate this to Dr. Franky Marcelino. Pt will come to groups and participate when called upon.   No aggression.  Caryn Fernando continue to engage in treatment and monitor medications.      LOS:  7                       Expected LOS: 30     Insurance info/prescription [address]  Date of last family contact: None  Family requesting physician contact today:  no  Discharge plan:  Discharge home with follow-up in the community  Guns in the home:  no   Outpatient provider(s):  [facility]

## 2022-02-07 NOTE — BH NOTES
Pt has spent most of the day in his room, in bed. Pt is only up for lunch and dinner. No s/s of distress noted. No complaints of pain. Pt continues to acknowledge auditory hallucinations, however does not disclose what they say. Verbalizes no improvement since his admission.

## 2022-02-07 NOTE — BH NOTES
B: Pt alert and oriented x4, Pt cooperative and pleasant during assessment. Pt states depression 5/10, but denies anxiety, SI/HI and AVH. RAHUL Henryes with pt tonight. Pt stated he felt better today. Gabe Parham stated he would hold off on the anti-depressant. Pt continues on Q15min safety checks.      I: Encourage pt to attend and participate in all groups and wrap up; Administer medication as ordered and needed. Encourage pt be up for all meals and snacks.  Encourage pt to interact with staff/peers in a positive manner; Pt continues on Y51llmi safety checks.     R: Pt received snack and participated in wrap up. Did write on wrap up, how he handled his aggression and handling the voices well again tonight but when I asked about voices pt denied and staff has never seen any aggression. Pt medication administer at this time. Pt continues on Q15min safety checks.     P: Pt will develop and utilize positive coping skills. Pt Will continue on Q15min safety checks.     0600: Pt slept throughout the night with no distress noted

## 2022-02-07 NOTE — PROGRESS NOTES
Problem: Psychosis  Goal: *STG: Decreased delusional thinking  Outcome: Progressing Towards Goal  Goal: *STG: Remains safe in hospital  Outcome: Progressing Towards Goal  Goal: *STG: Patient will develop strategies to regulate emotions and corresponding behaviors  Outcome: Progressing Towards Goal  Goal: *STG: Participates in individual and group therapy  Outcome: Progressing Towards Goal  Goal: *STG: Patient will verbalize issues that get in their way of progress (i.e.: anger, fear etc.)  Outcome: Progressing Towards Goal  Goal: *STG/LTG: Complies with medication therapy  Outcome: Progressing Towards Goal

## 2022-02-07 NOTE — PROGRESS NOTES
Psychiatric Progress Note    Patient: Nellie Sequeira MRN: [id number]  SSN: [id number]    YOB: 1995  Age: 27 y.o. Sex: male      Admit Date: 1/31/2022       Subjective:     Nellie Sequeira  reports feeling okay. Seems to be more despondent and distant today, less engaging during interview    Denies SI/HI/AH/VH. No aggression or violence. Appropriately interactive and aware. Tolerating medications well. Patient has not been participating in meals as much and continues to isolate. Case reviewed with nursing staff and no significant issues or events reported in the last 24 hours. Staff has observed patient interacting on the unit and attending group. Objective:     Blood pressure 116/73, pulse 97, respiration 18 temperature 98 degrees.        Mental Status Exam:   Sensorium  Oriented to time, place, and situation   Relations Connects with interviewer and engages appropriately   Eye Contact Appropriate   Appearance:  Appropriate for venue and season   Speech:  Normal rate, tone, volume and pattern   Thought Process: Linear, logical and goal-directed   Thought Content Free of delusions, hallucinations and does not appear to be responding to internal stimuli   Suicidal ideations None active and contracts for safety   Mood:   Depressed   Affect:  Flat, constricted and mood congruent   Memory    Intact   Concentration:  Intact   Insight:   Fair   Judgment:  Fair     No signs of tardive dyskinesia or extrapyramidal symptoms    MEDICATIONS:  Current Facility-Administered Medications   Medication Dose Route Frequency    OLANZapine (ZyPREXA) tablet 5 mg  5 mg Oral Q6H PRN    haloperidol lactate (HALDOL) injection 5 mg  5 mg IntraMUSCular Q6H PRN    benztropine (COGENTIN) tablet 1 mg  1 mg Oral BID PRN    diphenhydrAMINE (BENADRYL) injection 50 mg  50 mg IntraMUSCular BID PRN    hydrOXYzine HCL (ATARAX) tablet 50 mg  50 mg Oral TID PRN    LORazepam (ATIVAN) injection 1 mg  1 mg IntraMUSCular Q4H PRN    traZODone (DESYREL) tablet 50 mg  50 mg Oral QHS PRN    acetaminophen (TYLENOL) tablet 650 mg  650 mg Oral Q4H PRN    magnesium hydroxide (MILK OF MAGNESIA) 400 mg/5 mL oral suspension 30 mL  30 mL Oral DAILY PRN    nicotine (NICODERM CQ) 14 mg/24 hr patch 1 Patch  1 Patch TransDERmal DAILY    risperiDONE (RisperDAL) tablet 2 mg  2 mg Oral BID        DISCUSSION:  Discussed risk and benefits of medication, provided an opportunity to answer any questions, reviewed discharge goals. Lab/Data Review:  No results found for this or any previous visit (from the past 24 hour(s)). Assessment:     Active Problems:    Schizophrenia ([facility] [address] (1/31/2022)        Plan:     Continue current plan of care excepted as noted. We will follow closely and consider the possibility of bupropion or a little activation if his affect remains blunted. Continue to participate in the milieu of activities and work towards discharge goals. Contracts for safety and has no immediate requests    Disposition planning with social work with the goal of a transition to an outpatient level of care. Patient would benefit from ongoing care as they have not yet reached their discharge goals are psychotropic medication optimization.     Tentative discharge TBD     Signed By: Chano Conteh, PhD, PA-C, PsyCAQ    February 7, 2022

## 2022-02-08 PROCEDURE — 65220000003 HC RM SEMIPRIVATE PSYCH

## 2022-02-08 PROCEDURE — 74011250637 HC RX REV CODE- 250/637: Performed by: PSYCHIATRY & NEUROLOGY

## 2022-02-08 RX ADMIN — RISPERIDONE 2 MG: 1 TABLET ORAL at 21:12

## 2022-02-08 RX ADMIN — RISPERIDONE 2 MG: 1 TABLET ORAL at 08:18

## 2022-02-08 RX ADMIN — OLANZAPINE 10 MG: 10 TABLET, ORALLY DISINTEGRATING ORAL at 21:12
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
## 2022-02-08 NOTE — BH NOTES
Pt has been resting in bed today, encouraged to be up for groups. Compliant with meds and encouraged to increase fluid intake. Safe on unit will continue to monitor.

## 2022-02-08 NOTE — BH NOTES
Behavioral Health Treatment Team Note      Patient goal(s) for today: Learn alternatives to aggression  Treatment team focus/goals: Engage in treatment, monitor medications     Progress note: Involuntary commitment up to 30 days. Pt is AO x 4, denies SI/HI, with auditory hallucinations, cooperative with assessment.  Pt presents guarded with a flat affect.  Pt appears fatigued.  Reports his mood is \"good. \" Jalen Yun is logical upon interview. Cortez Sutton is goal-oriented, soft tone, and slow. Insight is fair.  Pt continues to hear voices and states that he does not know what they are saying because he tries to block the \"whispers\" out. Pt's mother expressed concerns over pt's symptoms and behavior. Pt's mother stated pt is on Risperdal and it did not help in the past.  Pt's mother reports Kelly Bowen was not paid for by Medicaid, it worked well for him and was getting out of the house and talking more.   Dr. Sintia Niño has increased the dose of Risperdal. Pt will come to groups and participate when called upon.  No aggression.   Will continue to engage in treatment and monitor medications.      LOS:  8                       Expected LOS: 30     Insurance info/prescription [address]  Date of last family contact: 2/8/2022  Family requesting physician contact today:  no  Discharge plan:  Discharge home with follow-up in the community  Guns in the home:  no   Outpatient provider(s):  Hunter Causey

## 2022-02-08 NOTE — PROGRESS NOTES
Discussed case interviewed patient  Current further input from mother and nursing staff  Talked in treatment team  Remains paranoid, hearing voices  Safety issues remain  We discussed that risperidone is similar to [place] that has worked in the past and he is on a good dose of 4 mg of risperidone daily total  But despite this he continues to have acute psychotic symptoms  Thus we added Zyprexa 10 mg nightly    Mental status exam  Alert oriented fair eye contact  Speech is goal-directed soft tone  Mood is irritable depressed  Affect is restricted  Positive paranoid ideations  Positive hearing voices    Recommendations  Continue inpatient treatments  As above added Zyprexa 10 mg nightly  Follow-up with mental team again tomorrow

## 2022-02-08 NOTE — PROGRESS NOTES
Pt is progressing toward his goals to the best of his ability. Denies suicidal of homicidal thoughts. States he feels a little depressed but not anxious. Hears voices sometimes. Poor eater but sleeps well, compliant with meds. Safe on meds.
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
## 2022-02-08 NOTE — BH NOTES
Treatment Team Meeting held with patient , Dr. Chelly Huang, RN Clinical Coordinator, Georgette Paul RN Charge Nurse and Kayla Gilliam LCSW. Patient lying in bed and does not eat well and does not want to get out of bed for ADLS or for activites and groups. Patient continues to have auditory hallucinations. Lies  in bed with his head covered up. States he is fine but behaviors show otherwise. Patient has been on Risperidone 2 mg PO BID and Had Zyprexia 1-mg added at bedtime. First dose given last pm. Team encouraged patient to get up out of bed and complete ADL's and attend groups and activities. Patient will be encouraged to eat meals and snacks. Patient denies SI/HI ideations. Will continue to monitor every 15 minutes for safety.

## 2022-02-08 NOTE — GROUP NOTE
MELVINA  GROUP DOCUMENTATION INDIVIDUAL                                                                          Group Therapy Note    Date: 2/8/2022    Group Start Time: 1400  Group End Time: 6800  Group Topic: Education Group - Inpatient    [facility] [address] [facility] GROUP DOCUMENTATION GROUP    Group Therapy Note    LCSW facilitated discharge planning with group members. Group members identified, goals, resources, and plans for discharge. Attendance: Attended    Patient's Goal:  Attendance    Interventions/techniques: Informed, Validated and Supported    Follows Directions:  Followed directions    Interactions: Interacted appropriately    Mental Status: Calm and Congruent    Behavior/appearance: Attentive and Cooperative    Goals Achieved: Able to listen to others and Discussed discharge plans        MARTI Maria, LCSW

## 2022-02-09 PROCEDURE — 74011250637 HC RX REV CODE- 250/637: Performed by: PSYCHIATRY & NEUROLOGY

## 2022-02-09 PROCEDURE — 65220000003 HC RM SEMIPRIVATE PSYCH

## 2022-02-09 RX ADMIN — RISPERIDONE 2 MG: 1 TABLET ORAL at 08:38

## 2022-02-09 RX ADMIN — RISPERIDONE 2 MG: 1 TABLET ORAL at 20:59

## 2022-02-09 RX ADMIN — OLANZAPINE 10 MG: 10 TABLET, ORALLY DISINTEGRATING ORAL at 21:00

## 2022-02-09 NOTE — PROGRESS NOTES
Problem: Psychosis  Goal: *STG: Decreased hallucinations  Outcome: Progressing Towards Goal  Goal: *STG: Remains safe in hospital  Outcome: Progressing Towards Goal  Goal: *STG: Seeks staff when feelings of self harm or harm towards others arise  Outcome: Progressing Towards Goal  Goal: *STG: Patient will verbalize areas in need of boundary recognition and limit setting  Outcome: Progressing Towards Goal  Goal: *STG: Participates in individual and group therapy  Outcome: Progressing Towards Goal  Goal: *STG/LTG: Complies with medication therapy  Outcome: Progressing Towards Goal  Goal: Interventions  Outcome: Progressing Towards Goal

## 2022-02-09 NOTE — BH NOTES
B:  Pt received in his room . Pt. Alert and oriented x 3.  Pt. States depression is a 5 Pt. States anxiety is 0.  Pt.  Denies SI/HI. reported faraz voices at times( man voice\" but said he is trying to block those voices, I encouraged him to be in activity room with others and watch TV instead of being alone in the room.  No complaints with assessment. He was encouraged to take shower andf he took one. He attended wrap up group and ate snack  as he reported. pt accepted HS medication   and said he don't want to take the green pill, explained to him that no green bill with his Hs medication , educated that Zyprexa is yellow color and Risperidone  white color, he took the medication then he said it is the same meaning that Zyprexa which he refer as to the green pill although it is yellow in color, he said that Zyprexa make the voices louder, in formed that to observe the effect tonight and that will be reported day time to follow up with doctor, also encouraged to let the doctor aware about this.     I:   Administer medications as ordered and needed, Encourage pt to attend and participate in groups, encourage pt to be up for all meals and snacks, consuming all each time, encourage pt to interact with peers in a positive manner. Q 15 minute safety checks continue.     R:   Compliant with medications.  he  attend groups, does participate.  Pt. Ate HS snack. Pt. Mateo Hof with peers. no safety concerns at this time.     P:   juliocesar continue current treatment plan and safety checks Q 15 minutes. Lorin Vasquez

## 2022-02-09 NOTE — BH NOTES
B:  Received  awake lying in bed. Complete nursing assessment done. Reports having a good night. Pt. Alert and oriented x 3. Pt. States depression is a 4. Pt. States anxiety is 0. Pt. denies hallucinations. Denies SI/HI. Cooperative with assessment. Reports hearing voices. Voices not telling him to hurt himself or hurt others. Hears people talking sometimes but unable to comprehend to conversation. Reports he feels like he is ready to go home and that he is doing much better than when he came to [facility]. Feels the medication is working. I:   Administer medications as ordered and needed, Encourage pt to attend and participate in groups, encourage pt to be up for all meals and snacks, consuming all each time, encourage pt to interact with peers in a positive manner. Q 15 minute safety checks continue. R:   Compliant with medications. does not attend groups, does not participate. Pt. is getting up for meals, consumes 50% of meals, snacks. Pt. does not interact with peers. no safety concerns at this time. P:   Pt. Will develop and continue to utilize positive coping skills. Will continue with Plan of Care as ordered. Will participate in group and interact with staff and peers.

## 2022-02-09 NOTE — BH NOTES
Dr Martin Jean was in formed about what patient syaing about the Zyprexuia that it make voice louder, at 2245, no ne worder made.     Pt slept overnight remained sleeping  as of this time, no violent no self harming behavior noticed or reported

## 2022-02-10 PROCEDURE — 74011250637 HC RX REV CODE- 250/637: Performed by: PSYCHIATRY & NEUROLOGY

## 2022-02-10 PROCEDURE — 65220000003 HC RM SEMIPRIVATE PSYCH

## 2022-02-10 RX ADMIN — RISPERIDONE 2 MG: 1 TABLET ORAL at 08:34

## 2022-02-10 RX ADMIN — RISPERIDONE 2 MG: 1 TABLET ORAL at 21:11

## 2022-02-10 RX ADMIN — OLANZAPINE 10 MG: 10 TABLET, ORALLY DISINTEGRATING ORAL at 21:11

## 2022-02-10 NOTE — BH NOTES
Behavioral Health Treatment Team Note      Patient goal(s) for today: Learn alternatives to aggression  Treatment team focus/goals: Engage in treatment, monitor medications     Progress note: Involuntary commitment up to 30 days. Pt is AO x 4, denies SI/HI, with auditory hallucinations, cooperative with assessment.  Pt presents guarded with a flat affect.  Pt appears fatigued.  Reports his mood is \"okay\", but adds that he feels depressed.  Thinking is logical upon interview. Pecolia [place] is goal-oriented, soft tone, and slow. Insight is fair.  Depression is 8/10 and pt denies anxiety. Pt continues to hear voices and states that they are small mumbles.   Pt will come to groups and participate when called upon.  No aggression.   Will continue to engage in treatment and monitor medications.      LOS:  10                       Expected LOS: 30     Insurance info/prescription [address]  Date of last family contact: 2/8/2022  Family requesting physician contact today:  no  Discharge plan:  Discharge home with follow-up in the community  Guns in the home:  no   Outpatient provider(s):  Hunter Causey

## 2022-02-10 NOTE — BH NOTES
B:  Received awake lying in bed. Nursing assessment done. Encouraged to get up for breakfast. Reports he slept good last night. Pt. Alert and oriented x 3. Pt. States depression is a 3. Pt. States anxiety is 0. Pt. denies hallucinations at present, but reports that he does hear voices at times. Denies SI/HI. Cooperative with assessment. Has set goal for today to try to get out of bed more and eat better during meals. I:   Administer medications as ordered and needed, Encourage pt to attend and participate in groups, encourage pt to be up for all meals and snacks, consuming all each time, encourage pt to interact with peers in a positive manner. Q 15 minute safety checks continue. R:  Compliant with medications. does not attend groups, does not participate. Pt. is getting up for meals, consumes 75% of lunch, dinner, and snacks. Pt. does not interact with peers. no safety concerns at this time. P:   Pt. Will develop and continue to utilize positive coping skills. Will interact with peers and staff. Will get up more during the day and spend less time in bed.

## 2022-02-10 NOTE — GROUP NOTE
MELVINA  GROUP DOCUMENTATION INDIVIDUAL                                                                          Group Therapy Note    Date: 2/10/2022    Group Start Time: 1911  Group End Time: 0197  Group Topic: Education Group - Inpatient    [facility]    Group Therapy Note    LCSW provided psychoeducation on emotions. Emotions were discussed. Mindful acceptance was taught and group members participated in [id number] exercise. Attendance: Attended    Patient's Goal:  Attendance    Interventions/techniques: Informed and Validated    Follows Directions:  Followed directions    Interactions: Interacted appropriately    Mental Status: Calm and Congruent    Behavior/appearance: Attentive and Cooperative    Goals Achieved: Able to engage in interactions and Able to listen to others          Leandrew Client, MSW, LCSW

## 2022-02-10 NOTE — BH NOTES
B: Pt is out of room to group this evening, states he is doing okay. Denies suicidal and homicidal thoughts, says he continues to have a little depression and anxiety. States he hears mumbling voices. Compliant with meds, eating better and sleeping well. Attending and participating in groups, safe on unit. I: Maintain a safe environment for pt, monitor q 15 mins and prn. Give meds as ordered, and encourage groups. R: Pt is cooperative with assessment, denies suicidal and homicidal thoughts. Says he wants to go home soon. Safe on unit. P: Continue to monitor, give m eds as ordered, encourage groups.

## 2022-02-10 NOTE — PROGRESS NOTES
Pt is progressing toward his goals. Denies suicidal and homicidal thoughts. Continues to have depression and anxiety, hears mumbling voices. Eating better, sleeping well. Compliant with meds, and attending groups. Safe on unit.

## 2022-02-10 NOTE — GROUP NOTE
MELVINA  GROUP DOCUMENTATION INDIVIDUAL                                                                          Group Therapy Note    Date: 2/10/2022    Group Start Time: 1695  Group End Time: 5004  Group Topic: Education Group - Inpatient    [facility] [address] GROUP DOCUMENTATION GROUP    Group Therapy Note    Group members participated in psychoeducational and process oriented group on Self-Compassion. Self-compassion was defined and discussed. LCSW led in experiential exercise for self-compassion break. Attendance: Attended    Patient's Goal:  Attendance    Interventions/techniques: Informed and Validated    Follows Directions:  Followed directions    Interactions: Interacted appropriately    Mental Status: Calm and Congruent    Behavior/appearance: Attentive and Cooperative    Goals Achieved: Able to listen to others          MARTI Baez, ALEXANDREW

## 2022-02-11 PROCEDURE — 65220000003 HC RM SEMIPRIVATE PSYCH

## 2022-02-11 PROCEDURE — 74011250637 HC RX REV CODE- 250/637: Performed by: PSYCHIATRY & NEUROLOGY

## 2022-02-11 RX ADMIN — RISPERIDONE 2 MG: 1 TABLET ORAL at 21:01

## 2022-02-11 RX ADMIN — RISPERIDONE 2 MG: 1 TABLET ORAL at 08:25

## 2022-02-11 RX ADMIN — OLANZAPINE 10 MG: 10 TABLET, ORALLY DISINTEGRATING ORAL at 21:01

## 2022-02-11 NOTE — PROGRESS NOTES
Discussed case interviewed patient  Continues to have acute psychotic symptoms  Zyprexa 10 mg nightly started  This medication adjunct to risperidone may help with his psychotic symptoms  Slightly less paranoid  Continues to report hearing voices  Withdrawn prefers to stay in bed  No overt aggression  Discussed in treatment team    Mental status exam  Alert oriented fair eye contact  Speech is goal-directed soft tone  Affect restricted guarded  Positive paranoid ideations  Positive hearing voices    Recommendations  Continue inpatient treatments  On 2 antipsychotic medications as clinically indicated  Follow-up with me again tomorrow

## 2022-02-11 NOTE — BH NOTES
B: Pt alert and oriented x4, Pt cooperative and pleasant during assessment. Pt states depression 3/10, and dealing with the voices are better but denies anxiety, SI/HI and VH. Pt continues on Q15min safety checks.      I: Encourage pt to attend and participate in all groups and wrap up; Administer medication as ordered and needed. Encourage pt be up for all meals and snacks. Encourage pt to interact with staff/peers in a positive manner; Pt continues on [id number] safety checks.     R: Pt received snack and participated in wrap up. Pt medication administer at this time. Pt was out interacting with peers tonight. Pt continues on Q15min safety checks.     P: Pt will develop and continue to utilize positive coping skills. Come out of room more.  Will continue with Plan of Care as ordered. Pt Will continue on Q15min safety checks.     0600: Pt slept throughout the night with no distress noted.

## 2022-02-11 NOTE — GROUP NOTE
MELVINA  GROUP DOCUMENTATION INDIVIDUAL                                                                          Group Therapy Note    Date: 2/11/2022    Group Start Time: 1010  Group End Time: 1287  Group Topic: Education Group - Inpatient    [facility]    Group Therapy Note    LCSW facilitated group on Grounding and emotions. LCSW provided psychoeducational interventions and led in experiential learning of grounding techniques. Group members participated in experiential learning exercise and discussion. Attendance: Attended    Patient's Goal:  Attendance    Interventions/techniques: Informed and Validated    Follows Directions:  Followed directions    Interactions: Interacted appropriately    Mental Status: Calm and Congruent    Behavior/appearance: Attentive and Cooperative    Goals Achieved: Able to engage in interactions and Able to listen to others          MARTI Carrasquillo, LCSW

## 2022-02-11 NOTE — BH NOTES
B: Pt. Alert and oriented x 3. Pt. States depression is a 0. Pt. States anxiety is 0. Pt. denies hallucinations. Denies SI/HI. No complaints with assessment. I:   Administer medications as ordered and needed, Encourage pt to attend and participate in groups, encourage pt to be up for all meals and snacks, consuming all each time, encourage pt to interact with peers in a positive manner. Q 15 minute safety checks continue. R:   Compliant with medications. does attend groups, does participate. Pt. is getting up for meals, consumes 75% of meals, snacks. Pt. does interact with peers. no safety concerns at this time. P:   Pt. Will develop and continue to utilize positive coping skills. Will attend and participate in group sessions, 1:1 sessions, and treatment team meeting.

## 2022-02-11 NOTE — PROGRESS NOTES
Discussed case interviewed patient  Taking and tolerating medications well  Slightly less paranoid  No overt aggression  Benefits from inpatient treatments  Personal care also noted  Hearing voices and paranoid ideations  Some improvement but is gradual and slow    Mental status exam  Alert oriented fair eye contact  Speech is soft tone  Affect is guarded  Remains psychotic and hearing voices  Pledges for safety on the unit    Recommendations  Continue inpatient treatments  Medication regimen safety issues reviewed  Also had discussed in treatment team and nursing staff  Follow-up with me again tomorrow

## 2022-02-11 NOTE — BH NOTES
Behavioral Health Treatment Team Note      Patient goal(s) for today: Learn alternatives to aggression  Treatment team focus/goals: Engage in treatment, monitor medications     Progress note: Involuntary commitment up to 30 days. Pt is AO x 4, denies SI/HI, denies AVH, cooperative with assessment.  Pt presents guarded with a flat affect.  Pt appears fatigued.  Reports his mood is \"okay\".  Thinking is logical upon interview. Bebe  is goal-oriented, soft tone, and slow. Insight is fair.  Depression is 2/10 and pt denies anxiety. Pt identifies aggression as a problem he is working on. Pt identifies possible coping alternatives are talking to someone, breathing exercises, and grounding exercises.    Pt will come to groups and participate when called upon.  No aggression.   Will continue to engage in treatment and monitor medications.      LOS:  11                       Expected LOS: 30     Insurance info/prescription [address]  Date of last family contact: 2/8/2022  Family requesting physician contact today:  no  Discharge plan:  Discharge home with follow-up in the community  Guns in the home:  no   Outpatient provider(s):  [facility]

## 2022-02-12 PROCEDURE — 65220000003 HC RM SEMIPRIVATE PSYCH

## 2022-02-12 PROCEDURE — 74011250637 HC RX REV CODE- 250/637: Performed by: PSYCHIATRY & NEUROLOGY

## 2022-02-12 RX ADMIN — RISPERIDONE 2 MG: 1 TABLET ORAL at 21:01

## 2022-02-12 RX ADMIN — OLANZAPINE 10 MG: 10 TABLET, ORALLY DISINTEGRATING ORAL at 21:01

## 2022-02-12 RX ADMIN — RISPERIDONE 2 MG: 1 TABLET ORAL at 08:11

## 2022-02-12 NOTE — BH NOTES
B: Pt. Alert and oriented x 4. Pt. States depression is a 0. Pt. States anxiety is 0. Pt. States \"once in awhile\" hallucinations. Denies SI/HI. Cooperative with assessment. I:   Administer medications as ordered and needed, Encourage pt to attend and participate in groups, encourage pt to be up for all meals and snacks, consuming all each time, encourage pt to interact with peers in a positive manner. Q 15 minute safety checks continue. R:   Compliant with medications. does attend groups, does participate. Pt. is getting up for meals, consumes all of meals, snacks. Pt. does interact with peers. no safety concerns at this time. P:   Pt. Will develop and continue to utilize positive coping skills.

## 2022-02-12 NOTE — BH NOTES
Pt. Interacting more with peers,  In day room in between meals and groups, including both, as well. No safety concerns noted, denies SI/HI, q 15 minute safety checks continue.

## 2022-02-12 NOTE — PROGRESS NOTES
Pt is progressing toward his goals. Denies suicidal and homicidal thoughts, rates depression at 2/10 with no anxiety. Continues to have auditory hallucinations. Attending and participating in groups, compliant with meds. Safe on unit.

## 2022-02-12 NOTE — BH NOTES
B: Pt is out of room in Group Room this evening. Has been watching TV and attended and participated in group. Eating and sleeping well and is compliant with meds. Denies suicidal and homicidal thoughts, rates depression at 2/10 with no anxiety. Continues to have auditory hallucinations. Safe on unit will continue to monitor. I: Maintain a safe environment for pt, safety cks q 15 mins and prn. Give meds as ordered, encourage groups. R: Pt is cooperative with assessment and interacts well with staff. Denies suicidal and homicidal thoughts. Encouraged to continue attending groups and to work on coping skills. Safe on unit. P: Continue to monitor, give meds as ordered, encourage groups.

## 2022-02-13 PROCEDURE — 74011250637 HC RX REV CODE- 250/637: Performed by: PSYCHIATRY & NEUROLOGY

## 2022-02-13 PROCEDURE — 65220000003 HC RM SEMIPRIVATE PSYCH

## 2022-02-13 RX ADMIN — OLANZAPINE 10 MG: 10 TABLET, ORALLY DISINTEGRATING ORAL at 21:00

## 2022-02-13 RX ADMIN — RISPERIDONE 2 MG: 1 TABLET ORAL at 08:05

## 2022-02-13 RX ADMIN — RISPERIDONE 2 MG: 1 TABLET ORAL at 21:00

## 2022-02-13 NOTE — BH NOTES
Pt. Quiet, smiling at times, interacting with peers, up for groups and meals. No safety concerns q 15 minute safety checks continue.

## 2022-02-13 NOTE — BH NOTES
B: Pt has been out and about on unit this evening, states he is doing okay. Attended and participated in group and had snack. Denies suicidal and homicidal thoughts, says he is not feeling depressed or anxious. Continues to hear voices at times. Eating and sleeping well, compliant with meds. Safe on unit. I: Maintain a safe environment for pt, safety cks q 15 mins and prn. Give meds as ordered, encourage groups. R: Cooperative with assessment, states he is doing okay. Denies suicidal and homicidal thoughts. Pleasant with staff and peers. Safe on unit. P: Continue to monitor, give meds as ordered, encourage groups.

## 2022-02-13 NOTE — BH NOTES
Pt rested well tonight with no complaints voiced and no problems noted on rounds. Safe on unit will continue to monitor.

## 2022-02-13 NOTE — PROGRESS NOTES
Pt is progressing toward his goals. Denies suicidal and homicidal thoughts, says his depression and anxiety is better. Compliant with meds, attending and participating in  groups. Safe on unit.

## 2022-02-14 PROCEDURE — 65220000003 HC RM SEMIPRIVATE PSYCH

## 2022-02-14 PROCEDURE — 74011250637 HC RX REV CODE- 250/637: Performed by: PSYCHIATRY & NEUROLOGY

## 2022-02-14 RX ORDER — RISPERIDONE 2 MG/1
2 TABLET, FILM COATED ORAL 2 TIMES DAILY
Qty: 60 TABLET | Refills: 1 | Status: SHIPPED | OUTPATIENT
Start: 2022-02-14 | End: 2022-03-16

## 2022-02-14 RX ORDER — OLANZAPINE 10 MG/1
10 TABLET, ORALLY DISINTEGRATING ORAL
Qty: 30 TABLET | Refills: 1 | Status: SHIPPED | OUTPATIENT
Start: 2022-02-14 | End: 2022-03-16

## 2022-02-14 RX ADMIN — OLANZAPINE 10 MG: 10 TABLET, ORALLY DISINTEGRATING ORAL at 21:14

## 2022-02-14 RX ADMIN — RISPERIDONE 2 MG: 1 TABLET ORAL at 08:41

## 2022-02-14 RX ADMIN — RISPERIDONE 2 MG: 1 TABLET ORAL at 21:14

## 2022-02-14 NOTE — PROGRESS NOTES
Discussed case interviewed patient  Taking and tolerating medications well  More able to hold and have a conversation  Less paranoid less irritable  Talking to himself less  Decreased hearing voices  More interactive than before  Starting to get out of his room more and attends groups  Remains paranoid    Mental status exam  Alert oriented cooperative  Showing some improvement in moods  Decreased psychotic outlook  But still paranoid  Insight and judgment fair    Recommendations  Continue inpatient treatments  He is starting to show some progress  Follow-up with me in the team again tomorrow

## 2022-02-14 NOTE — BH NOTES
Behavioral Health Treatment Team Note      Patient goal(s) for today: Learn alternatives to aggression  Treatment team focus/goals: Engage in treatment, monitor medications     Progress note: Involuntary commitment up to 30 days. Pt is AO x 4, denies SI/HI, denies AVH, cooperative with assessment.  Pt presents guarded with a restricted affect.   Reports his mood is good.  Thinking is logical upon interview. Randine Area is goal-oriented, soft tone, and slow. Insight is fair.  Denies depression and anxiety. Pt has been walking the unit, interacting with others, and cooperating with staff. Pt identifies aggression as a problem he is working on. Pt identifies possible coping alternatives are talking to someone, breathing exercises, and grounding exercises.    Pt reports he desires to continue to psychiatry and counseling after hospitalization. Pt will come to groups and participates when called upon.  No aggression.   Will continue to engage in treatment and monitor medications.      LOS:  14                    Expected LOS: 30     Insurance info/prescription [address]  Date of last family contact: 2/8/2022  Family requesting physician contact today:  no  Discharge plan:  Discharge home with follow-up in the community  Guns in the home:  no   Outpatient provider(s):  [facility]

## 2022-02-14 NOTE — PROGRESS NOTES
Discussed case interviewed patient  Now on 2 antipsychotic medications  Tolerating medications better  Less paranoid less hearing voices  More interactive  Attends groups, more reasonable conversations  Eating and sleeping better  Some improvement noted with recent adjustments    Mental status exam  Alert oriented fair eye contact  Speech is goal-directed spontaneous  Mood is okay affect is restricted  Positive paranoid ideations  Less irritable less responding to internal stimuli  Insight and judgment fair    Recommendations  Continue olanzapine 10 mg nightly and risperidone 2 mg 2 times a day  Continue inpatient treatment  Follow-up with me again tomorrow

## 2022-02-14 NOTE — BH NOTES
Pt slept overnight remained sleeping  as of this time, no violent no self harming behavior noticed or reported

## 2022-02-14 NOTE — BH NOTES
LCSW contacted Reanna Yateskrishan, pt's mother, to return phone call in reference to pt care. Reanna Ty wanted update on pt's condition and possible d/c date. LCSW updated pt's mother on pt progress and tomorrow's discharge date. No concerns noted.

## 2022-02-14 NOTE — GROUP NOTE
MELVINA  GROUP DOCUMENTATION INDIVIDUAL                                                                          Group Therapy Note    Date: 2/14/2022    Group Start Time: 6563  Group End Time: 1500  Group Topic: Education Group - Inpatient    [facility]    Group Therapy Note    LCSW facilitated psycheducational group on self-management for mental illness. Group members received handouts with symptoms of mental illness, crisis resources, and coping skills. LCSW facilitated discussion on personal coping skills, crisis, and wellness. Attendance: Attended    Patient's Goal:  Attendance    Interventions/techniques: Informed, Validated and Supported    Follows Directions:  Followed directions    Interactions: Interacted appropriately    Mental Status: Calm and Congruent    Behavior/appearance: Attentive and Cooperative    Goals Achieved: Able to engage in interactions and Able to listen to others      MARTI Bennett, LCSW

## 2022-02-14 NOTE — BH NOTES
B: Pt is alert and oriented x3. Pt is cooperative with assessments. Pt reports feeling \"good\". Pt states they are here because hallucinations. Pt identifies they're doing better since their admission because still hearing voices. Pt is able to identify personal coping alternatives. No s/s of distress noted. No complaints of pain. I: Assess Pt mood. Document presence of depressive/anxiety symptoms. Assess for Audio/visual hallucinations. Establish trust.  Educate Pt on medications and disease processes. Monitor ADLs, food/fluid consumption and sleep. Encouarge group participation and socialization. Educate Pt on medications, coping alternatives, disease processes, and community resources. Safety checks. R: Pt mood is stable. Pt rates depression 0/10, identifies triggers as none. Pt rates anxiety at 0/10, identifies triggers as none. Pt denies SI. Pt denies HI. Pt reports audio/visual hallucinations, s/s are not observed by staff. Pt reports he continues to hear voices, mumbling, unable to make out what they are saying. Pt states he has heard them for awhile and they never go away. Pt is attending groups and is interacting appropriately with staff/peers. Pt is eating all meals, and is maintaining their personal hygiene with prompting. Pt reports sleeping well. Pt is compliant wiith medications. Pt has been out of his room more, no delay in responses observed. P:  Encourage socialization and interaction with staff/peers.

## 2022-02-14 NOTE — BH NOTES
B:  Pt received in shower taking shower. . Pt. Alert and oriented x 3.  Pt. States depression is a 4 Pt. States anxiety is 0.  Pt.  Denies SI/HI. reported hearing voices , didn't give more details about voices. No complaints with assessment. Marci North attended wrap up group and ate snack    pt accepted HS medication       I:   Administer medications as ordered and needed, Encourage pt to attend and participate in groups, encourage pt to be up for all meals and snacks, consuming all each time, encourage pt to interact with peers in a positive manner. Q 15 minute safety checks continue.     R:   Compliant with medications.  he  attend groups, does participate.  Pt. Ate HS snack. Pt. Does interact with peers . no safety concerns at this time.     P:   juliocesar continue current treatment plan and safety checks Q 15 minutes. Yung Calixto

## 2022-02-14 NOTE — PROGRESS NOTES
Discussed case interviewed patient  Taking and tolerating medications well  No overt aggression no suicidality  Remains paranoid  Hearing voices  Tolerating the medication okay along with the previous medication  Sleep is better  Eating and interacting with others more    Mental status exam  Alert oriented fair eye contact  Speech is goal-directed soft tone  Mood is depressed  Positive paranoid ideations  Positive hearing voices    Recommendations  Continue inpatient treatments  Follow-up with psychiatric team tomorrow

## 2022-02-15 VITALS
DIASTOLIC BLOOD PRESSURE: 56 MMHG | BODY MASS INDEX: 22.4 KG/M2 | RESPIRATION RATE: 20 BRPM | TEMPERATURE: 97.7 F | WEIGHT: 160 LBS | OXYGEN SATURATION: 99 % | HEART RATE: 74 BPM | HEIGHT: 71 IN | SYSTOLIC BLOOD PRESSURE: 93 MMHG

## 2022-02-15 PROCEDURE — 74011250637 HC RX REV CODE- 250/637: Performed by: PSYCHIATRY & NEUROLOGY

## 2022-02-15 RX ADMIN — RISPERIDONE 2 MG: 1 TABLET ORAL at 08:45

## 2022-02-15 NOTE — BH NOTES
DISCHARGE SUMMARY    NAME:Casimiro Howe  : 1995  MRN: [id number]    The patient Tito Preston exhibits the ability to control behavior in a less restrictive environment. Patient's level of functioning is improving. No assaultive/destructive behavior has been observed for the past 24 hours. No suicidal/homicidal threat or behavior has been observed for the past 24 hours. There is no evidence of serious medication side effects. Patient has not been in physical or protective restraints for at least the past 24 hours. If weapons involved, how are they secured? None    Is patient aware of and in agreement with discharge plan? Yes    Arrangements for medication:  Prescriptions given to patient, given a weeks supply or 30 day supply. Copy of discharge instructions to provider?:  Yes    Arrangements for transportation home:  Medicaid Cab    Keep all follow up appointments as scheduled, continue to take prescribed medications per physician instructions. Mental health crisis number:  161 or your local mental health crisis line number at [phone]) 025-7343.       Mental Health Emergency WARM LINE      [phone] [phone])      M-F: 9am to 9pm      Sat & Sun: 5pm - 9pm  National suicide prevention lines:                             1-761-GYAJUYC (5-294-484-7971)       2-205-345-TALK (5-330.710.3214)    Crisis Text Line:  Text HOME to 832633

## 2022-02-15 NOTE — BH NOTES
LCSW contacted [employer] at [phone] and spoke to [employer]. Transportation is set and scheduled for 12:56 due to the distance, but the cab could be here within the hour. Confirmation number [id number].

## 2022-02-15 NOTE — BH NOTES
Pt was discharged this morning. Pt denied any SI/HI prior to being discharged. Staff escorted the pt off the unit. No problems or concerns.

## 2022-02-15 NOTE — BH NOTES
B: Pt alert and oriented x4, Pt cooperative and pleasant during assessment. Pt states depression 1/10, and dealing with the voices are better, only hear mumbling; denies anxiety, SI/HI and VH. Excited about going home tomorrow. Pt continues on Q15min safety checks.      I: Encourage pt to attend and participate in all groups and wrap up; Administer medication as ordered and needed. Encourage pt be up for all meals and snacks. Encourage pt to interact with staff/peers in a positive manner; Pt continues on [id number] safety checks.     R: Pt received snack and participated in wrap up. Pt medication administer at this time. Pt was out interacting with peers tonight. Pt continues on Q15min safety checks.     P: Pt will develop and continue to utilize positive coping skills.  Will continue with Plan of Care as ordered. Pt Will continue on Q15min safety checks.     0600: Pt slept throughout the night with no distress noted.

## 2022-02-15 NOTE — BH NOTES
B:   Pt. Alert and oriented x 4.  Pt. States depression is a 0.  Pt. States anxiety is 0.  Pt. Denies Hallucinations.  Denies SI/HI.  Cooperative with assessment.   I:   Administer medications as ordered and needed, Encourage pt to attend and participate in groups, encourage pt to be up for all meals and snacks, consuming all each time, encourage pt to interact with peers in a positive manner. Q 15 minute safety checks continue. R:   Compliant with medications.  does attend groups, does participate.  Pt. is getting up for meals, consumes all of meals, snacks. Pt. does interact with peers. no safety concerns at this time. P:   Pt.  Will develop and continue to utilize positive coping skills.

## 2022-02-15 NOTE — PROGRESS NOTES
Problem: Psychosis  Goal: *STG: Decreased delusional thinking  Outcome: Progressing Towards Goal  Goal: *STG: Remains safe in hospital  Outcome: Progressing Towards Goal  Goal: *STG: Patient will develop strategies to regulate emotions and corresponding behaviors  Outcome: Progressing Towards Goal  Goal: *STG: Participates in individual and group therapy  Outcome: Progressing Towards Goal

## 2022-02-15 NOTE — BH NOTES
Pt. Belongings given back to pt., verified all there, signed for. Discharge instructions explained to pt. Including, Follow up appt. With Boni BRADY . Medications called into [facility] in [facility]. Pt. Denies SI/HI at time of discharge. Pt. is a Smoker. Smoking cessation education wasprovided. Pt. is not a drinker. Alcohol Education was not Provided. Discharge Paperwork and H & P, faxed to Boni BRADY @ [phone], With success sheet. Pt. was not discharged on 2 or more Antipsychotics. Pt. Displayed no safety concerns at discharge. Pt. Escorted to front by staff for transportation by [facility], to home.

## 2022-02-15 NOTE — BH NOTES
Behavioral Health Transition Record to Provider    Patient Name: Rashida Moe  YOB: 1995  Medical Record Number: [id number]  Date of Admission: 1/31/2022  Date of Discharge: 2/15/2022    Attending Provider: Christian Guan MD  Discharging Provider: Davy Haynes  To contact this individual call [phone] and ask the  to page. If unavailable, ask to be transferred to [address] [place] [address] Provider on call. [facility] Provider will be available on call 24/7 and during holidays. Primary Care Provider: None    No Known Allergies    Reason for Admission: Prescreen notes pt's mother petitioned for ECO stating, \"Casimiro is paranoid schizophrenic and he hasn't been taking his meds. Rhenda Lisa Rhenda Lisa \"  She also reports that pt has been walking out in the cold to his ex-girlfriend's house where he is not wanted - police were called to get him to leave. Pt jumped out of the car when turning into the hospital. Lopez Raider notes pt began hearing voices in 2021 telling him to harm others. Pt describes hearing one male voice upon interview, but not discernable words. Delusional thoughts about being a prophet and belief in the illuminati. Paranoia that others or something is watching him. Pt ran out of medication due to inability to pay $1300 or it. Admission Diagnosis: Schizophrenia ([facility] [address] [F20.9]    * No surgery found *    Results for orders placed or performed during the hospital encounter of 01/31/22   LIPID PANEL   Result Value Ref Range    LIPID PROFILE        Cholesterol, total 102 <200 mg/dL    Triglyceride 55 <150 mg/dL    HDL Cholesterol 59 mg/dL    LDL, calculated 32 0 - 100 mg/dL    VLDL, calculated 11 mg/dL    CHOL/HDL Ratio 1.7 0.0 - 5.0     HEMOGLOBIN A1C WITH EAG   Result Value Ref Range    Hemoglobin A1c 4.8 4.0 - 5.6 %    Est. average glucose 91 mg/dL       Immunizations administered during this encounter:  There is no immunization history for the selected administration types on file for this patient. Screening for Metabolic Disorders for Patients on Antipsychotic Medications  (Data obtained from the EMR)    Estimated Body Mass Index  Estimated body mass index is 22.32 kg/m² as calculated from the following:    Height as of this encounter: 5' 11\" (1.803 m). Weight as of this encounter: 72.6 kg (160 lb). Vital Signs/Blood Pressure  Visit Vitals  BP (!) 93/56   Pulse 74   Temp 97.7 °F (36.5 °C)   Resp 20   Ht 5' 11\" (1.803 m)   Wt 72.6 kg (160 lb)   SpO2 99%   BMI 22.32 kg/m²       Blood Glucose/Hemoglobin A1c  No results found for: GLU, GLUCPOC    Lab Results   Component Value Date/Time    Hemoglobin A1c 4.8 02/01/2022 07:22 AM        Lipid Panel  Lab Results   Component Value Date/Time    Cholesterol, total 102 02/01/2022 07:22 AM    HDL Cholesterol 59 02/01/2022 07:22 AM    LDL, calculated 32 02/01/2022 07:22 AM    Triglyceride 55 02/01/2022 07:22 AM    CHOL/HDL Ratio 1.7 02/01/2022 07:22 AM        Discharge Diagnosis: Schizophrenia    Discharge Plan: Discharge home with follow-up at [place]. Discharge Medication List and Instructions:   Current Discharge Medication List      START taking these medications    Details   OLANZapine (ZyPREXA zydis) 10 mg disintegrating tablet Take 1 Tablet by mouth nightly for 30 days. Indications: schizophrenia  Qty: 30 Tablet, Refills: 1  Start date: 2/14/2022, End date: 3/16/2022      risperiDONE (RisperDAL) 2 mg tablet Take 1 Tablet by mouth two (2) times a day for 30 days. Indications: schizophrenia  Qty: 61 Tablet, Refills: 1  Start date: 2/14/2022, End date: 3/16/2022             Unresulted Labs (24h ago, onward)            None        To obtain results of studies pending at discharge, please contact [phone].     Follow-up Information     Follow up With Specialties Details Why Contact Info    None    None ([phone]) Patient stated that they have no PCP      [facility] -   Call on 2/16/2022 Yong Parker - [phone] ext [phone] [facility] [address] [facility]. [address] - phone [phone] -  fax [phone]          Advanced Directive:   Does the patient have an appointed surrogate decision maker? No  Does the patient have a Medical Advance Directive? No  Does the patient have a Psychiatric Advance Directive? No  If the patient does not have a surrogate or Medical Advance Directive AND Psychiatric Advance Directive, the patient was offered information on these advance directives Patient declined to complete and Patient will complete at a later time    Patient Instructions: Please continue all medications until otherwise directed by physician. Tobacco Cessation Discharge Plan:   Is the patient a smoker and needs referral for smoking cessation? No  Patient referred to the following for smoking cessation with an appointment? Not applicable     Patient was offered medication to assist with smoking cessation at discharge? Not applicable  Was education for smoking cessation added to the discharge instructions? Not applicable    Alcohol/Substance Abuse Discharge Plan:   Does the patient have a history of substance/alcohol abuse and requires a referral for treatment? No  Patient referred to the following for substance/alcohol abuse treatment with an appointment? Not applicable  Patient was offered medication to assist with alcohol cessation at discharge? Not applicable  Was education for substance/alcohol abuse added to discharge instructions? Not applicable    Patient discharged to Home; discussed with patient/caregiver and provided to the patient/caregiver either in hard copy or electronically.

## 2022-04-18 NOTE — DISCHARGE SUMMARY
Discharge summary    Hospital course  This is a discharge summary for Patricia Avery was admitted to psychiatric unit January 21 and discharged February 15, 2022, he was mostly under my care during his hospitalization. He presents to the hospital with psychotic symptoms including paranoid ideations and responding to internal stimuli. He had significant depressive symptoms and also dangerous behaviors including exiting from a moving vehicle. He was acutely psychotic with paranoid ideations and hearing voices. He was also irritable and required a longer stay due to his acute psychosis. Initially he was not a good historian but we gather that he has been in [facility] and also hospital in [place] within the last months and had a significant psychotic symptoms history. He was also on medication Invega before. We started him on risperidone 2 mg nightly at the unit and he had some benefit but he was slow to respond. He was significantly psychotic confused paranoid and also seem to be responding to internal stimuli and initially and hearing voices. As his hospitalization continued he was more open about disclosing his psychotic symptoms. He also had an cannabis abuse and alcohol abuse which likely was contributing to severity of his psychosis and depression. As hospitalization continued we are noticing some improvement but this was gradual and slow and he remains psychotic thus we added olanzapine to his medication regimen which she tolerated well. We have discussed his progress in treatment team.  As his stay continued he was able to have more reasonable conversations with peers and staff attended groups was more interactive. He was able to maintain safe behaviors on the unit. We also discussed safety issues and discharge planning in terms of safety when he leaves.   As treatment team noticed that he was showing some improvement and also denied suicidal ideations and placed for safety we decided that the goals of acute unit stay was met and that he could continues treatment in the outpatient setting February 15 thus he was discharged that day. He placed for safety outside the hospital at discharge.     Discharge mental status exam  Alert oriented fair eye contact  Speech is goal-directed soft tone  Mood is improved  Affect is less restricted  Denies paranoid ideations  No evidence of hearing voices  Does not appear to respond to internal stimuli  No suicidality    Discharge diagnosis  Schizoaffective disorder    Discharge recommendations  Continue current medications including risperidone 2 mg nightly and olanzapine 10 mg nightly  Referrals and appointments were made to the outpatient setting  He pledges for safety outside the hospital    Discharge condition  Improved, we wish him well